# Patient Record
Sex: MALE | Race: WHITE | NOT HISPANIC OR LATINO | Employment: OTHER | URBAN - METROPOLITAN AREA
[De-identification: names, ages, dates, MRNs, and addresses within clinical notes are randomized per-mention and may not be internally consistent; named-entity substitution may affect disease eponyms.]

---

## 2018-05-02 ENCOUNTER — HOSPITAL ENCOUNTER (EMERGENCY)
Facility: MEDICAL CENTER | Age: 53
End: 2018-05-02
Attending: EMERGENCY MEDICINE
Payer: COMMERCIAL

## 2018-05-02 ENCOUNTER — APPOINTMENT (OUTPATIENT)
Dept: RADIOLOGY | Facility: MEDICAL CENTER | Age: 53
End: 2018-05-02
Attending: EMERGENCY MEDICINE
Payer: COMMERCIAL

## 2018-05-02 VITALS
SYSTOLIC BLOOD PRESSURE: 128 MMHG | HEIGHT: 71 IN | WEIGHT: 175.93 LBS | BODY MASS INDEX: 24.63 KG/M2 | DIASTOLIC BLOOD PRESSURE: 91 MMHG | OXYGEN SATURATION: 97 % | TEMPERATURE: 97.6 F | RESPIRATION RATE: 16 BRPM | HEART RATE: 76 BPM

## 2018-05-02 DIAGNOSIS — M25.562 ACUTE PAIN OF LEFT KNEE: ICD-10-CM

## 2018-05-02 PROCEDURE — 99284 EMERGENCY DEPT VISIT MOD MDM: CPT

## 2018-05-02 PROCEDURE — 93971 EXTREMITY STUDY: CPT

## 2018-05-02 PROCEDURE — 700102 HCHG RX REV CODE 250 W/ 637 OVERRIDE(OP): Performed by: EMERGENCY MEDICINE

## 2018-05-02 PROCEDURE — 700111 HCHG RX REV CODE 636 W/ 250 OVERRIDE (IP): Performed by: EMERGENCY MEDICINE

## 2018-05-02 PROCEDURE — A9270 NON-COVERED ITEM OR SERVICE: HCPCS | Performed by: EMERGENCY MEDICINE

## 2018-05-02 PROCEDURE — 73564 X-RAY EXAM KNEE 4 OR MORE: CPT | Mod: LT

## 2018-05-02 RX ORDER — PREDNISONE 20 MG/1
60 TABLET ORAL ONCE
Status: COMPLETED | OUTPATIENT
Start: 2018-05-03 | End: 2018-05-02

## 2018-05-02 RX ORDER — PREDNISONE 20 MG/1
60 TABLET ORAL DAILY
Status: DISCONTINUED | OUTPATIENT
Start: 2018-05-03 | End: 2018-05-02

## 2018-05-02 RX ORDER — INDOMETHACIN 50 MG/1
50 CAPSULE ORAL 3 TIMES DAILY
Qty: 30 CAP | Refills: 0 | Status: SHIPPED | OUTPATIENT
Start: 2018-05-02 | End: 2018-05-11

## 2018-05-02 RX ORDER — PREDNISONE 20 MG/1
60 TABLET ORAL DAILY
Qty: 15 TAB | Refills: 0 | Status: SHIPPED | OUTPATIENT
Start: 2018-05-02 | End: 2018-05-07

## 2018-05-02 RX ORDER — OXYCODONE HYDROCHLORIDE AND ACETAMINOPHEN 5; 325 MG/1; MG/1
2 TABLET ORAL ONCE
Status: COMPLETED | OUTPATIENT
Start: 2018-05-02 | End: 2018-05-02

## 2018-05-02 RX ADMIN — OXYCODONE HYDROCHLORIDE AND ACETAMINOPHEN 2 TABLET: 5; 325 TABLET ORAL at 22:03

## 2018-05-02 RX ADMIN — PREDNISONE 60 MG: 20 TABLET ORAL at 23:49

## 2018-05-02 ASSESSMENT — PAIN SCALES - GENERAL: PAINLEVEL_OUTOF10: 8

## 2018-05-03 NOTE — DISCHARGE INSTRUCTIONS
You were seen in the Emergency Department for knee pain.    Xrays and ultrasound were completed without significant acute abnormalities.    Please use 1,000mg of tylenol every 6 hours as needed for pain. Take indomethacin and prednisone as directed. Please elevate and stay off your lower extremity as much as possible.    Please follow up with your primary care physician.    Return to the Emergency Department with fevers greater than 100.4, uncontrolled pain, increasing redness, warmth or swelling, or other concerns.      Knee Pain  Knee pain is a very common symptom and can have many causes. Knee pain often goes away when you follow your health care provider's instructions for relieving pain and discomfort at home. However, knee pain can develop into a condition that needs treatment. Some conditions may include:  · Arthritis caused by wear and tear (osteoarthritis).  · Arthritis caused by swelling and irritation (rheumatoid arthritis or gout).  · A cyst or growth in your knee.  · An infection in your knee joint.  · An injury that will not heal.  · Damage, swelling, or irritation of the tissues that support your knee (torn ligaments or tendinitis).  If your knee pain continues, additional tests may be ordered to diagnose your condition. Tests may include X-rays or other imaging studies of your knee. You may also need to have fluid removed from your knee. Treatment for ongoing knee pain depends on the cause, but treatment may include:  · Medicines to relieve pain or swelling.  · Steroid injections in your knee.  · Physical therapy.  · Surgery.  HOME CARE INSTRUCTIONS  · Take medicines only as directed by your health care provider.  · Rest your knee and keep it raised (elevated) while you are resting.  · Do not do things that cause or worsen pain.  · Avoid high-impact activities or exercises, such as running, jumping rope, or doing jumping jacks.  · Apply ice to the knee area:  ¨ Put ice in a plastic bag.  ¨ Place a  towel between your skin and the bag.  ¨ Leave the ice on for 20 minutes, 2-3 times a day.  · Ask your health care provider if you should wear an elastic knee support.  · Keep a pillow under your knee when you sleep.  · Lose weight if you are overweight. Extra weight can put pressure on your knee.  · Do not use any tobacco products, including cigarettes, chewing tobacco, or electronic cigarettes. If you need help quitting, ask your health care provider. Smoking may slow the healing of any bone and joint problems that you may have.  SEEK MEDICAL CARE IF:  · Your knee pain continues, changes, or gets worse.  · You have a fever along with knee pain.  · Your knee gustavo or locks up.  · Your knee becomes more swollen.  SEEK IMMEDIATE MEDICAL CARE IF:   · Your knee joint feels hot to the touch.  · You have chest pain or trouble breathing.  This information is not intended to replace advice given to you by your health care provider. Make sure you discuss any questions you have with your health care provider.  Document Released: 10/14/2008 Document Revised: 01/08/2016 Document Reviewed: 08/03/2015  [employer] Interactive Patient Education © 2017 [employer]    Gout  Gout is painful swelling that can occur in some of your joints. Gout is a type of arthritis. This condition is caused by having too much uric acid in your body. Uric acid is a chemical that forms when your body breaks down substances called purines. Purines are important for building body proteins.  When your body has too much uric acid, sharp crystals can form and build up inside your joints. This causes pain and swelling. Gout attacks can happen quickly and be very painful (acute gout). Over time, the attacks can affect more joints and become more frequent (chronic gout). Gout can also cause uric acid to build up under your skin and inside your kidneys.  What are the causes?  This condition is caused by too much uric acid in your blood. This can occur  because:  · Your kidneys do not remove enough uric acid from your blood. This is the most common cause.  · Your body makes too much uric acid. This can occur with some cancers and cancer treatments. It can also occur if your body is breaking down too many red blood cells (hemolytic anemia).  · You eat too many foods that are high in purines. These foods include organ meats and some seafood. Alcohol, especially beer, is also high in purines.  A gout attack may be triggered by trauma or stress.  What increases the risk?  This condition is more likely to develop in people who:  · Have a family history of gout.  · Are male and middle-aged.  · Are female and have gone through menopause.  · Are obese.  · Frequently drink alcohol, especially beer.  · Are dehydrated.  · Lose weight too quickly.  · Have an organ transplant.  · Have lead poisoning.  · Take certain medicines, including aspirin, cyclosporine, diuretics, levodopa, and niacin.  · Have kidney disease or psoriasis.  What are the signs or symptoms?  An attack of acute gout happens quickly. It usually occurs in just one joint. The most common place is the big toe. Attacks often start at night. Other joints that may be affected include joints of the feet, ankle, knee, fingers, wrist, or elbow. Symptoms may include:  · Severe pain.  · Warmth.  · Swelling.  · Stiffness.  · Tenderness. The affected joint may be very painful to touch.  · Shiny, red, or purple skin.  · Chills and fever.  Chronic gout may cause symptoms more frequently. More joints may be involved. You may also have white or yellow lumps (tophi) on your hands or feet or in other areas near your joints.  How is this diagnosed?  This condition is diagnosed based on your symptoms, medical history, and physical exam. You may have tests, such as:  · Blood tests to measure uric acid levels.  · Removal of joint fluid with a needle (aspiration) to look for uric acid crystals.  · X-rays to look for joint  damage.  How is this treated?  Treatment for this condition has two phases: treating an acute attack and preventing future attacks. Acute gout treatment may include medicines to reduce pain and swelling, including:  · NSAIDs.  · Steroids. These are strong anti-inflammatory medicines that can be taken by mouth (orally) or injected into a joint.  · Colchicine. This medicine relieves pain and swelling when it is taken soon after an attack. It can be given orally or through an IV tube.  Preventive treatment may include:  · Daily use of smaller doses of NSAIDs or colchicine.  · Use of a medicine that reduces uric acid levels in your blood.  · Changes to your diet. You may need to see a specialist about healthy eating (dietitian).  Follow these instructions at home:  During a Gout Attack  · If directed, apply ice to the affected area:  ¨ Put ice in a plastic bag.  ¨ Place a towel between your skin and the bag.  ¨ Leave the ice on for 20 minutes, 2-3 times a day.  · Rest the joint as much as possible. If the affected joint is in your leg, you may be given crutches to use.  · Raise (elevate) the affected joint above the level of your heart as often as possible.  · Drink enough fluids to keep your urine clear or pale yellow.  · Take over-the-counter and prescription medicines only as told by your health care provider.  · Do not drive or operate heavy machinery while taking prescription pain medicine.  · Follow instructions from your health care provider about eating or drinking restrictions.  · Return to your normal activities as told by your health care provider. Ask your health care provider what activities are safe for you.  Avoiding Future Gout Attacks  · Follow a low-purine diet as told by your dietitian or health care provider. Avoid foods and drinks that are high in purines, including liver, kidney, anchovies, asparagus, herring, mushrooms, mussels, and beer.  · Limit alcohol intake to no more than 1 drink a day for  nonpregnant women and 2 drinks a day for men. One drink equals 12 oz of beer, 5 oz of wine, or 1½ oz of hard liquor.  · Maintain a healthy weight or lose weight if you are overweight. If you want to lose weight, talk with your health care provider. It is important that you do not lose weight too quickly.  · Start or maintain an exercise program as told by your health care provider.  · Drink enough fluids to keep your urine clear or pale yellow.  · Take over-the-counter and prescription medicines only as told by your health care provider.  · Keep all follow-up visits as told by your health care provider. This is important.  Contact a health care provider if:  · You have another gout attack.  · You continue to have symptoms of a gout attack after10 days of treatment.  · You have side effects from your medicines.  · You have chills or a fever.  · You have burning pain when you urinate.  · You have pain in your lower back or belly.  Get help right away if:  · You have severe or uncontrolled pain.  · You cannot urinate.  This information is not intended to replace advice given to you by your health care provider. Make sure you discuss any questions you have with your health care provider.  Document Released: 12/15/2001 Document Revised: 05/25/2017 Document Reviewed: 09/29/2016  [employer] Interactive Patient Education © 2017 [employer]

## 2018-05-03 NOTE — ED NOTES
Pt roomed from [facility], ambulatory. Gait steady with a limp. Pt presents for symptoms as stated in the triage note. ERP at bedside.

## 2018-05-03 NOTE — ED NOTES
D/C instructions reviewed in detail with pt. Pt states understanding. Scripts reviewed in detail and given x2. Pt left ambulatory. Family at side and will drive home.

## 2018-05-03 NOTE — ED PROVIDER NOTES
ED Provider Note    Scribed for Aleida Hernandez M.D. by Willie Hinton. 5/2/2018  9:33 PM    Means of arrival: Walk-in  History obtained from: Patient  History limited by: None      CHIEF COMPLAINT  Chief Complaint   Patient presents with   • Gout     L knee arthrocentesis at [facility] 2 days ago, does not take allopurinol or otherwise   • Knee Pain     L knee       HPI  Cheng Rodriguez is a 52 y.o. male who presents to the Emergency Department for evaluation of left knee pain. Per patient, he was evaluated at [facility] 2 days ago for left knee arthrocentesis and he explained that he had this knee drained. He was diagnosed with gout at that time. He was unable to have his prescriptions filled due to lack of insurance. Once he was released from the hospital, he was able to ambulate without any problems. However, the patient explains that he is on foot and does not have another mode of transportation. Since yesterday, his pain has started again and explains that this has been worsening since then. The patient has not taken any medications for this pain. He does admit to smoking marijuana and occasionally drinking alcohol. He denies recent fevers, chills, diarrhea, chest pain or difficulty breathing. The patient does explain that he has a past history of gout a few years ago and explains this was in his right knee. He also admits to a family history of gout. He notes that movement is an exacerbating factor.     REVIEW OF SYSTEMS  Pertinent positive include left knee pain. Pertinent negative include fevers, chills, diarrhea, chest pain, or difficulty breathing. All other systems reviewed and are negative.    C.       PAST MEDICAL HISTORY   has a past medical history of Gout.Past history of gout in his right knee.     SOCIAL HISTORY  Social History     Social History Main Topics   • Smoking status: None noted   • Smokeless tobacco: None noted   • Alcohol use None noted   • Drug use: None noted   • Sexual activity:  "None noted        SURGICAL HISTORY  patient denies any surgical history    CURRENT MEDICATIONS  No current facility-administered medications on file prior to encounter.      No current outpatient prescriptions on file prior to encounter.       ALLERGIES  No Known Allergies    PHYSICAL EXAM   VITAL SIGNS: /88   Pulse 87   Temp 36.2 °C (97.2 °F)   Resp 16   Ht 1.803 m (5' 11\")   Wt 79.8 kg (175 lb 14.8 oz)   SpO2 97%   BMI 24.54 kg/m²    Constitutional: Non-toxic appearing.  Alert in no apparent distress.  HENT: Normocephalic, Atraumatic. Bilateral external ears normal. Nose normal.  Moist mucous membranes.  Oropharynx clear.  Eyes: Pupils are equal and reactive. Conjunctiva normal.   Neck: Supple, full range of motion  Heart: Regular rate and rhythm.  No murmurs.    Lungs: No respiratory distress, normal work of breathing. Lungs clear to auscultation bilaterally.  Abdomen Soft, no distention.  No tenderness to palpation.  Musculoskeletal: Atraumatic. No obvious deformities noted.  Mild swelling to the left knee, no significant overlying warmth or erythema, associated left calf tenderness to palpation, 2+ DP and PT pulses. Moderate range of motion without significant pain.  Skin: Warm, Dry.  No erythema, No rash.   Neurologic: Alert and oriented x3. Moving all extremities spontaneously without focal deficits.  Psychiatric: Affect normal, Mood normal, Appears appropriate and not intoxicated.    DIAGNOSTIC STUDIES    RADIOLOGY  Personally reviewed by jere VALDEZ VENOUS DUPLEX (Specify in Comments Left, Right Or Bilateral)   Final Result      DX-KNEE COMPLETE 4+ LEFT   Final Result         1.  Small ossific fragment along the articulating surface of the medial patella, could represent small fracture fragment or loose body within the joint space.          ED COURSE  Vitals:    05/02/18 2057 05/02/18 2104 05/02/18 2350   BP:  135/88 128/91   Pulse:  87 76   Resp:  16 16   Temp:  36.2 °C (97.2 °F) 36.4 °C (97.6 °F) " "  SpO2:  97% 97%   Weight: 79.8 kg (175 lb 14.8 oz)     Height: 1.803 m (5' 11\")           Medications administered:  Medications   oxyCODONE-acetaminophen (PERCOCET) 5-325 MG per tablet 2 Tab (2 Tabs Oral Given 5/2/18 8478)   predniSONE (DELTASONE) tablet 60 mg (60 mg Oral Given 5/2/18 8939)         Old records personally reviewed:  Unremarkable x-ray when evaluated at [facility] 2 days prior. Reviewed arthrocentesis without crystals, gram stain negative, 660 WBC. Discharged home with indomethacin.     9:33 PM Patient seen and examined at bedside. The patient presents with left knee pain. Ordered for DX-knee complete 4+. LE venous duplex left to evaluate. Patient will be treated with 5-325 mg Percocet, 60 mg Deltasone tablet for his symptoms.     MEDICAL DECISION MAKING  Homeless patient with reported history of prior gout who presents with persistent left knee pain. Patient was seen and evaluated at [facility] 2 days prior with unremarkable x-rays and a negative arthrocentesis of the left knee. He is afebrile with normal vitals on arrival and nontoxic-appearing. He has no signs of systemic illness. Repeat x-rays here demonstrates a possible loose body within the joint however he do not believe that this is acute. He has no evidence of fracture or dislocation. Lower extremity ultrasound is negative for DVT. Patient has no exam findings concerning for septic arthritis, cellulitis or necrotizing fasciitis at this time. After reviewing his arthrocentesis from 2 days prior do not feel that this needs to be repeated. He had no evidence of septic arthritis or crystals however does have a history of gout therefore will be treated in this manner. I had a long discussion with the patient that he needs to attempt to take his prescribed medications and attempt to stay off his leg which I understand is very difficult as he is homeless.      11:42 PM - Upon reassessment, patient is resting comfortably with normal " vital signs.  No new complaints at this time.  Discussed results with patient and/or family as well as importance of primary care follow up.  Patient understands plan of care and strict return precautions for new or changing symptoms.       The patient will return for new or worsening symptoms and is stable at the time of discharge. The patient is referred to a primary physician for blood pressure management, diabetic screening, and for all other preventative health concerns.    DISPOSITION:  Patient will be discharged home in stable condition.    FOLLOW UP:  [facility]  [address] [place] [id number]  [phone]  Call  to establish PCP    [facility], Emergency Dept  [address] [facility] [address] [phone]    If symptoms worsen      OUTPATIENT MEDICATIONS:  Discharge Medication List as of 5/2/2018 11:43 PM      START taking these medications    Details   indomethacin (INDOCIN) 50 MG Cap Take 1 Cap by mouth 3 times a day., Disp-30 Cap, R-0, Print Rx Paper      predniSONE (DELTASONE) 20 MG Tab Take 3 Tabs by mouth every day for 5 days., Disp-15 Tab, R-0, Print Rx Paper               IMPRESSION  (M25.562) Acute pain of left knee    Results, diagnoses, and treatment options were discussed with the patient and/or family. Patient verbalized understanding of plan of care.    Discharge Medication List as of 5/2/2018 11:43 PM      START taking these medications    Details   indomethacin (INDOCIN) 50 MG Cap Take 1 Cap by mouth 3 times a day., Disp-30 Cap, R-0, Print Rx Paper      predniSONE (DELTASONE) 20 MG Tab Take 3 Tabs by mouth every day for 5 days., Disp-15 Tab, R-0, Print Rx Paper              Willie MELARA (Steven), am scribing for, and in the presence of, Aleida Hernandez M.D..    Electronically signed by: Willie Martinez), 5/2/2018    Aleida MELARA M.D. personally performed the services described in this documentation, as scribed by Willie Hinton  in my presence, and it is both accurate and complete.    The note accurately reflects work and decisions made by me.  Aleida Hernandez  5/3/2018  3:04 AM

## 2018-05-03 NOTE — ED TRIAGE NOTES
"Chief Complaint   Patient presents with   • Gout     L knee arthrocentesis at [facility] 2 days ago, does not take allopurinol or otherwise   • Knee Pain     L knee     Pt ambulatory to triage w/ some obvious difficulty and L sided gait disturbance. Pt is obviously uncomfortable. Pt states he has not been able to get prescription for various insurance reasons. Pt's L knee is markedly swollen as compared to R, no erythema noted, no visual tophi. Pt states he has generalized low level of joint pain, denies SOB. Pt placed back in lobby at this time.    Height 1.803 m (5' 11\"), weight 79.8 kg (175 lb 14.8 oz).      "

## 2018-05-11 ENCOUNTER — APPOINTMENT (OUTPATIENT)
Dept: RADIOLOGY | Facility: MEDICAL CENTER | Age: 53
End: 2018-05-11
Payer: COMMERCIAL

## 2018-05-11 ENCOUNTER — APPOINTMENT (OUTPATIENT)
Dept: RADIOLOGY | Facility: MEDICAL CENTER | Age: 53
End: 2018-05-11
Attending: EMERGENCY MEDICINE
Payer: COMMERCIAL

## 2018-05-11 ENCOUNTER — HOSPITAL ENCOUNTER (EMERGENCY)
Facility: MEDICAL CENTER | Age: 53
End: 2018-05-12
Attending: EMERGENCY MEDICINE
Payer: COMMERCIAL

## 2018-05-11 DIAGNOSIS — M25.562 ACUTE PAIN OF LEFT KNEE: ICD-10-CM

## 2018-05-11 DIAGNOSIS — M25.462 KNEE EFFUSION, LEFT: ICD-10-CM

## 2018-05-11 DIAGNOSIS — Z87.39 HISTORY OF GOUT: ICD-10-CM

## 2018-05-11 LAB
GRAM STN SPEC: NORMAL
SIGNIFICANT IND 70042: NORMAL
SITE SITE: NORMAL
SOURCE SOURCE: NORMAL

## 2018-05-11 PROCEDURE — 87070 CULTURE OTHR SPECIMN AEROBIC: CPT

## 2018-05-11 PROCEDURE — 89051 BODY FLUID CELL COUNT: CPT

## 2018-05-11 PROCEDURE — 700101 HCHG RX REV CODE 250: Performed by: EMERGENCY MEDICINE

## 2018-05-11 PROCEDURE — 87205 SMEAR GRAM STAIN: CPT

## 2018-05-11 PROCEDURE — 700111 HCHG RX REV CODE 636 W/ 250 OVERRIDE (IP): Performed by: EMERGENCY MEDICINE

## 2018-05-11 PROCEDURE — 20610 DRAIN/INJ JOINT/BURSA W/O US: CPT

## 2018-05-11 PROCEDURE — 99284 EMERGENCY DEPT VISIT MOD MDM: CPT

## 2018-05-11 PROCEDURE — 89060 EXAM SYNOVIAL FLUID CRYSTALS: CPT

## 2018-05-11 PROCEDURE — 73564 X-RAY EXAM KNEE 4 OR MORE: CPT | Mod: LT

## 2018-05-11 RX ORDER — KETOROLAC TROMETHAMINE 30 MG/ML
30 INJECTION, SOLUTION INTRAMUSCULAR; INTRAVENOUS ONCE
Status: COMPLETED | OUTPATIENT
Start: 2018-05-11 | End: 2018-05-11

## 2018-05-11 RX ORDER — LIDOCAINE HYDROCHLORIDE AND EPINEPHRINE BITARTRATE 20; .01 MG/ML; MG/ML
10 INJECTION, SOLUTION SUBCUTANEOUS ONCE
Status: COMPLETED | OUTPATIENT
Start: 2018-05-11 | End: 2018-05-11

## 2018-05-11 RX ADMIN — LIDOCAINE HYDROCHLORIDE AND EPINEPHRINE 10 ML: 20; 10 INJECTION, SOLUTION INFILTRATION; PERINEURAL at 22:14

## 2018-05-11 RX ADMIN — KETOROLAC TROMETHAMINE 30 MG: 30 INJECTION, SOLUTION INTRAMUSCULAR at 23:05

## 2018-05-11 ASSESSMENT — LIFESTYLE VARIABLES
EVER HAD A DRINK FIRST THING IN THE MORNING TO STEADY YOUR NERVES TO GET RID OF A HANGOVER: NO
ON A TYPICAL DAY WHEN YOU DRINK ALCOHOL HOW MANY DRINKS DO YOU HAVE: 4
CONSUMPTION TOTAL: NEGATIVE
EVER FELT BAD OR GUILTY ABOUT YOUR DRINKING: NO
HAVE YOU EVER FELT YOU SHOULD CUT DOWN ON YOUR DRINKING: NO
HAVE PEOPLE ANNOYED YOU BY CRITICIZING YOUR DRINKING: NO
TOTAL SCORE: 0
HOW MANY TIMES IN THE PAST YEAR HAVE YOU HAD 5 OR MORE DRINKS IN A DAY: 0
TOTAL SCORE: 0
TOTAL SCORE: 0
DO YOU DRINK ALCOHOL: YES
AVERAGE NUMBER OF DAYS PER WEEK YOU HAVE A DRINK CONTAINING ALCOHOL: 3

## 2018-05-11 ASSESSMENT — PAIN SCALES - GENERAL
PAINLEVEL_OUTOF10: 5
PAINLEVEL_OUTOF10: 8

## 2018-05-12 ENCOUNTER — PATIENT OUTREACH (OUTPATIENT)
Dept: HEALTH INFORMATION MANAGEMENT | Facility: OTHER | Age: 53
End: 2018-05-12

## 2018-05-12 VITALS
TEMPERATURE: 97.7 F | WEIGHT: 174.82 LBS | HEART RATE: 88 BPM | SYSTOLIC BLOOD PRESSURE: 130 MMHG | RESPIRATION RATE: 17 BRPM | DIASTOLIC BLOOD PRESSURE: 75 MMHG | HEIGHT: 71 IN | BODY MASS INDEX: 24.48 KG/M2 | OXYGEN SATURATION: 98 %

## 2018-05-12 LAB
APPEARANCE FLD: CLEAR
BODY FLD TYPE: NORMAL
COLOR FLD: YELLOW
CRYSTALS FLD MICRO: NORMAL
CSF COMMENTS 1658: NORMAL
HISTIOCYTES NFR FLD: 2 %
LYMPHOCYTES NFR FLD: 45 %
MESOTHL CELL NFR FLD: 1 %
MONONUC CELLS NFR FLD: 48 %
NEUTROPHILS NFR FLD: 4 %
RBC # FLD: <2000 CELLS/UL
WBC # FLD: 412 CELLS/UL

## 2018-05-12 PROCEDURE — 99284 EMERGENCY DEPT VISIT MOD MDM: CPT

## 2018-05-12 PROCEDURE — 96372 THER/PROPH/DIAG INJ SC/IM: CPT

## 2018-05-12 RX ORDER — ACETAMINOPHEN 500 MG
1000 TABLET ORAL EVERY 8 HOURS PRN
Qty: 20 TAB | Refills: 0 | Status: SHIPPED | OUTPATIENT
Start: 2018-05-12 | End: 2018-07-16

## 2018-05-12 RX ORDER — IBUPROFEN 600 MG/1
600 TABLET ORAL EVERY 8 HOURS PRN
Qty: 20 TAB | Refills: 0 | Status: SHIPPED | OUTPATIENT
Start: 2018-05-12 | End: 2018-07-16

## 2018-05-12 NOTE — ED PROVIDER NOTES
"ED PROVIDER NOTE     Scribed for Memo Christensen M.D. by Ab Winter. 5/11/2018, 9:51 PM.    CHIEF COMPLAINT  Chief Complaint   Patient presents with   • Knee Pain     Pt. states left knee pain. Pt. has swelling to his left knee. Pt. state hx of gout and arthritis. Pt. states he has been seen here recently and was told to keep off of it but is unable to do so. Pt. states pain and swelling keeps getting worse. Pt. able to ambulate with steady but limping giat.        HPI    Primary care provider: None  Means of arrival: walk-in  History obtained from: patient  History limited by: none    Cheng Rodriguez is a 52 y.o. male who presents with left knee pain. Patient confirms a history of gout, for which he experiences chronic left knee pain. He states his pain is constant, and he endorses associated swelling. Patient does not note any recent injuries or trauma to his left knee. He describes his pain as \"sharp\", and he rates his pain as moderate in severity. The patient reports walking exacerbates his pain. He does report that he had relief with 2 medications prescribed recently, but he has run out of those medicines (on records review this was indomethacin and prednisone). Patient denies fevers, numbness, loss of motor function, rash, foot pain, or ankle pain or any other joint pain.  No eye pain, sore throat, or dysuria.  He also denies alcohol use. Patient admits to occasional marijuana use, but denies IV drug use. Other than gout, patient does not report any chronic medical history.     REVIEW OF SYSTEMS  Constitutional: Negative for fever or chills.  Musculoskeletal: Positive for left knee pain and left knee swelling. Negative for foot pain or ankle pain.   Skin: Negative for rash or redness or wounds.  Neuro: Negative for sensory loss or weakness.  Psychiatric/Behavioral: Negative for alcohol use or IV drug use.   All other systems reviewed and are negative.    PAST MEDICAL HISTORY   has a past medical history " "of Gout.    PAST FAMILY HISTORY  History reviewed. No pertinent family history.    SOCIAL HISTORY  Social History     Social History Main Topics   • Smoking status: Current Every Day Smoker     Packs/day: 0.12     Years: 24.00     Types: Cigarettes   • Smokeless tobacco: Never Used   • Alcohol use Yes      Comment: Socially   • Drug use: Yes     Types: Inhaled      Comment: Marijuana   • Sexual activity: Not on file       SURGICAL HISTORY  patient denies any surgical history    CURRENT MEDICATIONS  Recently prescribed prednisone and indomethacin, no chronic medications.    ALLERGIES  No Known Allergies    PHYSICAL EXAM  VITAL SIGNS: /77   Pulse 94   Temp 36 °C (96.8 °F)   Resp 14   Ht 1.803 m (5' 11\")   Wt 79.3 kg (174 lb 13.2 oz)   SpO2 97%   BMI 24.38 kg/m²    Pulse ox interpretation: On room air, I interpret this pulse ox as normal.  Constitutional: Uncomfortable appearing in mild distress.  HENT: Head is atraumatic. Mucous membranes moist.   Eyes: Conjunctivae are normal. EOMI.   Respiratory: No respiratory distress. Equal chest expansion.   Cardiovascular: RRR, no m/r/g.  Abdomen: Soft, nontender.  Musculoskeletal: Left knee swelling and pain with range of motion. No erythema. DP pulse intact. Neurovascularly intact distal to the knee.  No other joint pain or swelling.  Neurological: Alert. No focal deficits noted.    Skin: No rash. No Pallor.  No erythema.  Psych: Appropriate mood. Normal affect.     DIAGNOSTIC STUDIES / PROCEDURES    LABS/EKG  Results for orders placed or performed during the hospital encounter of 05/11/18   FLUID CELL COUNT   Result Value Ref Range    Fluid Type Synovial     Color-Body Fluid Yellow     Character-Body Fluid Clear     Total RBC Count <2000 cells/uL    Total  cells/uL    Polys 4 %    Lymphs 45 %    Mononuclear Cells - Fluid 48 %    Mesothelial Cells - CSF 1 %    Fluid Histiocyte 2 %    Comments see below    FLUID CRYSTALS   Result Value Ref Range    " Crystals, Body Fluid None Seen None Seen   GRAM STAIN   Result Value Ref Range    Significant Indicator .     Source SYNO     Site Left Knee     Gram Stain Result 05/11/2018  23:41  No organisms seen.         RADIOLOGY  DX-KNEE COMPLETE 4+ LEFT   Final Result         1. Moderate knee joint effusion, slightly less than prior.          PROCEDURES  Arthrocentesis Procedure Note    Indication: joint swelling    Consent: The patient was counseled regarding the procedure, it's indications, risks, potential complications and alternatives and any questions were answered. Consent was obtained.    Procedure: The left knee was positioned appropriately and the landmarks were identified.  Local anesthesia was obtained by infiltration using 2% Lidocaine with epinephrine.  The area was then prepped and draped in the usual sterile fashion.  A needle was then introduced into the joint space at which point 70 cc of clear, straw colored fluid was aspirated.  A joint injection was not performed.  The aspirated fluid was sent to the lab for appropriate testing.  A sterile dressing was then applied to the site.    The patient tolerated the procedure well.    Complications: None    COURSE & MEDICAL DECISION MAKING    This is a 52 y.o. male who presents with recurrent knee pain and swelling in the site where he is previously been diagnosed with gout.    Differential Diagnosis includes but is not limited to:  Fracture, dislocation, effusion, gout, septic arthritis, chronic pain    ED Course:  9:54 PM - Patient was seen and examined at bedside. Ordered knee x-ray to evaluate. Discussed possibly performing an arthrocentesis procedure. Patient was counseled on the procedure and the risks associated, including the possibility of infection, trauma, pain, bleeding.  He understood and verbalized agreement.       10:08 PM - Updated the patient on his imaging results, which reveal moderate knee effusion. I will perform an arthrocentesis procedure.  Patient understood and verbalized agreement.     10:40 PM - Performed Arthrocentesis procedure. See above note for further details.  Ordered fluid crystals, fluid culture, and fluid cell count to further evaluate. The patient will be treated with Toradol 30 mg for pain.     Synovial fluid analysis does not show obvious crystals, however there is no evidence of infection at this time.  Gram stain is negative, and there is not a significant amount of white blood cells.  Maxillary weather crystals which is not seen on lab testing as the patient says she has had a diagnosis of gout in the past, his history is consistent with gout, and I removed a large amount of straw-colored fluid that appeared to be gouty.  With no fevers or redness or white blood cells in the fluid I highly doubt septic joint.    12:53 AM - Reevaluated the patient at bedside. The patient is resting comfortably in the gurney, and he is feeling improved. Updated the patient on his lab and imaging results. The patient was counseled to follow-up with a primary care provider, for which I will provide contact information and I personally left a voicemail for scheduled follow-up for urgent internal medicine appointment. He will be discharged with prescriptions for Ibuprofen and Tylenol for pain management. He was given return precautions and welcomed to return to the ED with new or worsening symptoms. The patient understood and verbalized agreement.  Given he was just recently on a steroid course I will not give another at this time, and hope that with fluid removal, as well as assistive device provided (cane obtained by Ortho tech), and rest that he will hopefully recover well.    Medications   lidocaine-epinephrine 2 %-1:967142 injection 10 mL (10 mL Injection Given 5/11/18 2214)   ketorolac (TORADOL) injection 30 mg (30 mg Intramuscular Given 5/11/18 9557)     FINAL IMPRESSION  1. Acute pain of left knee    2. Knee effusion, left    3. History of gout         PRESCRIPTIONS  Discharge Medication List as of 5/12/2018  1:11 AM      START taking these medications    Details   ibuprofen (MOTRIN) 600 MG Tab Take 1 Tab by mouth every 8 hours as needed for Moderate Pain or Inflammation., Disp-20 Tab, R-0, Print Rx Paper      acetaminophen (TYLENOL) 500 MG Tab Take 2 Tabs by mouth every 8 hours as needed for Mild Pain or Moderate Pain., Disp-20 Tab, R-0, Print Rx Paper             FOLLOW UP  [facility]  [address] [place] [phone]  Schedule an appointment as soon as possible for a visit in 2 days  to set up a primary doctor    [address] [address] [phone]  Schedule an appointment as soon as possible for a visit in 2 days  to get a regular doctor    [facility], Emergency Dept  [address] [place] [id number] [phone]  In 1 day  If symptoms worsen        -DISCHARGE-     The patient is referred to a primary physician for the follow-up of today's complaints, as well as blood pressure management, diabetic screening, and for all other preventative health concerns.     Pertinent Labs & Imaging studies reviewed and verified by myself, as well as nursing notes and the patient's past medical, family, and social histories (See chart for details).    Results, exam findings, clinical impression, presumed diagnosis, treatment options, and strict return precautions were discussed with the patient, and they verbalized understanding, agreed with, and appreciated the plan of care.    IAb (Steven), am scribing for, and in the presence of, Memo Christensen M.D..    Electronically signed by: Ab Winter (Steven), 5/11/2018    Memo MELARA M.D. personally performed the services described in this documentation, as scribed by Ab Winter in my presence, and it is both accurate and complete.    Portions of this record were made with voice recognition software.   Despite my review, spelling/grammar/context errors may still remain.  Interpretation of this chart should be taken in this context.    The note accurately reflects work and decisions made by me.  Memo Christensen  5/12/2018  12:11 PM

## 2018-05-12 NOTE — ED TRIAGE NOTES
"Cheng Rodriguez  52 y.o. male  Chief Complaint   Patient presents with   • Knee Pain     Pt. states left knee pain. Pt. has swelling to his left knee. Pt. state hx of gout and arthritis. Pt. states he has been seen here recently and was told to keep off of it but is unable to do so. Pt. states pain and swelling keeps getting worse. Pt. able to ambulate with steady but limping giat.      /77   Pulse 94   Temp 36 °C (96.8 °F)   Resp 14   Ht 1.803 m (5' 11\")   Wt 79.3 kg (174 lb 13.2 oz)   SpO2 97%   BMI 24.38 kg/m²     Pt amb to triage with steady gait with slight left sided limp.  Pt is alert and oriented, speaking in full sentences, follows commands and responds appropriately to questions. NAD. Resp are even and unlabored.  Pt placed in lobby. Pt educated on triage process. Pt encouraged to alert staff for any changes.  "

## 2018-05-12 NOTE — ED NOTES
L knee tapped at bedside by Dr. Christensen. Patient tolerated well with minimal complaints of pain/discomfort. Fluid specimens collected during procedure sent to lab per orders.

## 2018-05-12 NOTE — ED NOTES
Pt ambulatory at discharge with cane. Pt verbalizes understanding of all discharge and follow up instructions.

## 2018-05-12 NOTE — DISCHARGE INSTRUCTIONS
You were seen and evaluated in the Emergency Department at [facility] for:     Knee pain and swelling    You had the following tests and studies:    Xray shows no broken bones or dislocations    You received the following medications:    Lidocaine injection, ketorolac    You received the following prescriptions:    Ibuprofen and acetaminophen   ----------------------------    Please make sure to follow up with:    A primary care doctor for a recheck on Monday, but please come right back to the ER if you get ANY new or worse pain, redness, fevers, swelling, inability to walk, or any other new or worsening symptoms.    Good luck, we hope you get better soon!  ----------------------------    We always encourage patients to return IMMEDIATELY if they have:  Increased or changing pain, passing out, fevers over 100.4 (taken in your mouth or rectally) for more than 2 days, redness or swelling of skin or tissues, feeling like your heart is beating fast, chest pain that is new or worsening, trouble breathing, feeling like your throat is closing up and can not breath, inability to walk, weakness of any part of your body, new dizziness, severe bleeding that won't stop from any part of your body, if you can't eat or drink, or if you have any other concerns.   If you feel worse, please know that you can always return with any questions, concerns, worse symptoms, or you are feeling unsafe. We certainly cannot say for sure that we have ruled out every illness or dangerous disease, but we feel that at this specific time, your exam, tests, and vital signs like heart rate and blood pressure are safe for discharge.         Joint Pain  Introduction  Joint pain can be caused by many things. The joint can be bruised, infected, weak from aging, or sore from exercise. The pain will probably go away if you follow your doctor's instructions for home care. If your joint pain continues, more tests may be needed to help find the  cause of your condition.  Follow these instructions at home:  Watch your condition for any changes. Follow these instructions as told to lessen the pain that you are feeling:  · Take medicines only as told by your doctor.  · Rest the sore joint for as long as told by your doctor. If your doctor tells you to, raise (elevate) the painful joint above the level of your heart while you are sitting or lying down.  · Do not do things that cause pain or make the pain worse.  · If told, put ice on the painful area:  ¨ Put ice in a plastic bag.  ¨ Place a towel between your skin and the bag.  ¨ Leave the ice on for 20 minutes, 2-3 times per day.  · Wear an elastic bandage, splint, or sling as told by your doctor. Loosen the bandage or splint if your fingers or toes lose feeling (become numb) and tingle, or if they turn cold and blue.  · Begin exercising or stretching the joint as told by your doctor. Ask your doctor what types of exercise are safe for you.  · Keep all follow-up visits as told by your doctor. This is important.  Contact a doctor if:  · Your pain gets worse and medicine does not help it.  · Your joint pain does not get better in 3 days.  · You have more bruising or swelling.  · You have a fever.  · You lose 10 pounds (4.5 kg) or more without trying.  Get help right away if:  · You are not able to move the joint.  · Your fingers or toes become numb or they turn cold and blue.  This information is not intended to replace advice given to you by your health care provider. Make sure you discuss any questions you have with your health care provider.  Document Released: 12/06/2010 Document Revised: 05/25/2017 Document Reviewed: 09/29/2015  © 2017 Elsevier      Knee Pain  Introduction  Knee pain is a common problem. It can have many causes. The pain often goes away by following your doctor's home care instructions. Treatment for ongoing pain will depend on the cause of your pain. If your knee pain continues, more tests  may be needed to diagnose your condition. Tests may include X-rays or other imaging studies of your knee.  Follow these instructions at home:  · Take medicines only as told by your doctor.  · Rest your knee and keep it raised (elevated) while you are resting.  · Do not do things that cause pain or make your pain worse.  · Avoid activities where both feet leave the ground at the same time, such as running, jumping rope, or doing jumping jacks.  · Apply ice to the knee area:  ¨ Put ice in a plastic bag.  ¨ Place a towel between your skin and the bag.  ¨ Leave the ice on for 20 minutes, 2-3 times a day.  · Ask your doctor if you should wear an elastic knee support.  · Sleep with a pillow under your knee.  · Lose weight if you are overweight. Being overweight can make your knee hurt more.  · Do not use any tobacco products, including cigarettes, chewing tobacco, or electronic cigarettes. If you need help quitting, ask your doctor. Smoking may slow the healing of any bone and joint problems that you may have.  Contact a doctor if:  · Your knee pain does not stop, it changes, or it gets worse.  · You have a fever along with knee pain.  · Your knee gives out or locks up.  · Your knee becomes more swollen.  Get help right away if:  · Your knee feels hot to the touch.  · You have chest pain or trouble breathing.  This information is not intended to replace advice given to you by your health care provider. Make sure you discuss any questions you have with your health care provider.  Document Released: 03/16/2010 Document Revised: 05/25/2017 Document Reviewed: 02/18/2015  © 2017 Elsevier      Knee Effusion  Introduction  Knee effusion means that you have extra fluid in your knee. This can cause pain. Your knee may be more difficult to bend and move.  Follow these instructions at home:  · Use crutches as told by your doctor.  · Wear a knee brace as told by your doctor.  · Apply ice to the swollen area:  ¨ Put ice in a plastic  bag.  ¨ Place a towel between your skin and the bag.  ¨ Leave the ice on for 20 minutes, 2-3 times per day.  · Keep your knee raised (elevated) when you are sitting or lying down.  · Take medicines only as told by your doctor.  · Do any rehabilitation or strengthening exercises as told by your doctor.  · Rest your knee as told by your doctor. You may start doing your normal activities again when your doctor says it is okay.  · Keep all follow-up visits as told by your doctor. This is important.  Contact a doctor if:  · You continue to have pain in your knee.  Get help right away if:  · You have increased swelling or redness of your knee.  · You have severe pain in your knee.  · You have a fever.  This information is not intended to replace advice given to you by your health care provider. Make sure you discuss any questions you have with your health care provider.  Document Released: 01/20/2012 Document Revised: 05/25/2017 Document Reviewed: 08/03/2015  © 2017 Elsevier

## 2018-05-14 LAB
BACTERIA FLD AEROBE CULT: NORMAL
GRAM STN SPEC: NORMAL
SIGNIFICANT IND 70042: NORMAL
SITE SITE: NORMAL
SOURCE SOURCE: NORMAL

## 2018-06-12 ENCOUNTER — HOSPITAL ENCOUNTER (EMERGENCY)
Facility: MEDICAL CENTER | Age: 53
End: 2018-06-13
Attending: EMERGENCY MEDICINE
Payer: COMMERCIAL

## 2018-06-12 DIAGNOSIS — M25.562 CHRONIC PAIN OF LEFT KNEE: ICD-10-CM

## 2018-06-12 DIAGNOSIS — G89.29 CHRONIC PAIN OF LEFT KNEE: ICD-10-CM

## 2018-06-12 PROCEDURE — 99284 EMERGENCY DEPT VISIT MOD MDM: CPT

## 2018-06-12 ASSESSMENT — PAIN SCALES - GENERAL: PAINLEVEL_OUTOF10: 7

## 2018-06-13 VITALS
DIASTOLIC BLOOD PRESSURE: 85 MMHG | HEIGHT: 70 IN | TEMPERATURE: 98.4 F | BODY MASS INDEX: 24.9 KG/M2 | RESPIRATION RATE: 16 BRPM | WEIGHT: 173.94 LBS | HEART RATE: 80 BPM | OXYGEN SATURATION: 94 % | SYSTOLIC BLOOD PRESSURE: 119 MMHG

## 2018-06-13 PROCEDURE — 700102 HCHG RX REV CODE 250 W/ 637 OVERRIDE(OP): Performed by: EMERGENCY MEDICINE

## 2018-06-13 PROCEDURE — A9270 NON-COVERED ITEM OR SERVICE: HCPCS | Performed by: EMERGENCY MEDICINE

## 2018-06-13 RX ORDER — TRAMADOL HYDROCHLORIDE 50 MG/1
50 TABLET ORAL ONCE
Status: COMPLETED | OUTPATIENT
Start: 2018-06-13 | End: 2018-06-13

## 2018-06-13 RX ORDER — TRAMADOL HYDROCHLORIDE 50 MG/1
50 TABLET ORAL EVERY 6 HOURS PRN
Qty: 12 TAB | Refills: 0 | Status: SHIPPED | OUTPATIENT
Start: 2018-06-13 | End: 2018-06-20

## 2018-06-13 RX ORDER — INDOMETHACIN 50 MG/1
50 CAPSULE ORAL ONCE
Status: COMPLETED | OUTPATIENT
Start: 2018-06-13 | End: 2018-06-13

## 2018-06-13 RX ORDER — INDOMETHACIN 50 MG/1
50 CAPSULE ORAL 3 TIMES DAILY
Qty: 30 CAP | Refills: 0 | Status: SHIPPED | OUTPATIENT
Start: 2018-06-13 | End: 2018-07-16

## 2018-06-13 RX ADMIN — INDOMETHACIN 50 MG: 50 CAPSULE ORAL at 00:51

## 2018-06-13 RX ADMIN — TRAMADOL HYDROCHLORIDE 50 MG: 50 TABLET, COATED ORAL at 00:51

## 2018-06-13 NOTE — ED TRIAGE NOTES
"Cheng Rodriguez  53 y.o.  /75   Pulse 77   Temp 37.2 °C (98.9 °F)   Resp 16   Ht 1.778 m (5' 10\")   Wt 78.9 kg (173 lb 15.1 oz)   SpO2 98%   BMI 24.96 kg/m²   Chief Complaint   Patient presents with   • Knee Pain     Left knee pain x 3 weeks. Seen for gout and fluid on knee, now states he thinks he pulled something      Sharp pains 7/10 in L knee, states feels like its going to dislocate with walking. Uses single point cane for ambulation. Would like to get pain medication and a referral to the [facility] to get it fixed. Swelling noted, no heat, discoloration. Pt safe to be returned to lobby, educated on triage process and wait times, instructed to notify any staff of worsening/changing of symptoms.      "

## 2018-06-13 NOTE — DISCHARGE INSTRUCTIONS
Knee Pain  Knee pain is a very common symptom and can have many causes. Knee pain often goes away when you follow your health care provider's instructions for relieving pain and discomfort at home. However, knee pain can develop into a condition that needs treatment. Some conditions may include:  · Arthritis caused by wear and tear (osteoarthritis).  · Arthritis caused by swelling and irritation (rheumatoid arthritis or gout).  · A cyst or growth in your knee.  · An infection in your knee joint.  · An injury that will not heal.  · Damage, swelling, or irritation of the tissues that support your knee (torn ligaments or tendinitis).  If your knee pain continues, additional tests may be ordered to diagnose your condition. Tests may include X-rays or other imaging studies of your knee. You may also need to have fluid removed from your knee. Treatment for ongoing knee pain depends on the cause, but treatment may include:  · Medicines to relieve pain or swelling.  · Steroid injections in your knee.  · Physical therapy.  · Surgery.  HOME CARE INSTRUCTIONS  · Take medicines only as directed by your health care provider.  · Rest your knee and keep it raised (elevated) while you are resting.  · Do not do things that cause or worsen pain.  · Avoid high-impact activities or exercises, such as running, jumping rope, or doing jumping jacks.  · Apply ice to the knee area:  ¨ Put ice in a plastic bag.  ¨ Place a towel between your skin and the bag.  ¨ Leave the ice on for 20 minutes, 2-3 times a day.  · Ask your health care provider if you should wear an elastic knee support.  · Keep a pillow under your knee when you sleep.  · Lose weight if you are overweight. Extra weight can put pressure on your knee.  · Do not use any tobacco products, including cigarettes, chewing tobacco, or electronic cigarettes. If you need help quitting, ask your health care provider. Smoking may slow the healing of any bone and joint problems that you  may have.  SEEK MEDICAL CARE IF:  · Your knee pain continues, changes, or gets worse.  · You have a fever along with knee pain.  · Your knee gustavo or locks up.  · Your knee becomes more swollen.  SEEK IMMEDIATE MEDICAL CARE IF:   · Your knee joint feels hot to the touch.  · You have chest pain or trouble breathing.  This information is not intended to replace advice given to you by your health care provider. Make sure you discuss any questions you have with your health care provider.  Document Released: 10/14/2008 Document Revised: 01/08/2016 Document Reviewed: 08/03/2015  [employer]vier Interactive Patient Education © 2017 [employer]

## 2018-06-13 NOTE — ED PROVIDER NOTES
ED Provider Note    Scribed for Aleida Hernandez M.D. by Willie Hinton. 6/12/2018  11:59 PM    Means of arrival: Walk-in  History obtained from: Patient  History limited by: None      CHIEF COMPLAINT  Chief Complaint   Patient presents with   • Knee Pain     Left knee pain x 3 weeks. Seen for gout and fluid on knee, now states he thinks he pulled something        HPI  Cheng Rodriguez is a 53 y.o. male who presents to the Emergency Department for with complaints of left knee pain. The patient explains that he has a history of gout and has had this drained previously in the last few months. He further explains that about 3 weeks ago he began having worsening pain in his left knee and he now thinks that he pulled something in the knee. He denies any significant trauma or falls.  Patient reports that he has not been able to walk further then 3 blocks or work recently. He denies any associated calf pain. The patient has not taken any Tylenol or Ibuprofen recently. Patient was given Tramadol and Indomethacin previously and he admits that this did alleviate his symptoms. No fevers or infectious symptoms.    REVIEW OF SYSTEMS  Pertinent positive include left knee pain. Pertinent negative include calf pain.    E.     PAST MEDICAL HISTORY   has a past medical history of Gout.    SOCIAL HISTORY  Social History     Social History Main Topics   • Smoking status: Current Every Day Smoker     Packs/day: 0.12     Years: 24.00     Types: Cigarettes   • Smokeless tobacco: Never Used   • Alcohol use Yes      Comment: Socially   • Drug use: Yes     Types: Inhaled      Comment: Marijuana   • Sexual activity: None noted       SURGICAL HISTORY  patient denies any surgical history    CURRENT MEDICATIONS  No current facility-administered medications on file prior to encounter.      Current Outpatient Prescriptions on File Prior to Encounter   Medication Sig Dispense Refill   • ibuprofen (MOTRIN) 600 MG Tab Take 1 Tab by mouth every 8 hours  "as needed for Moderate Pain or Inflammation. 20 Tab 0   • acetaminophen (TYLENOL) 500 MG Tab Take 2 Tabs by mouth every 8 hours as needed for Mild Pain or Moderate Pain. 20 Tab 0         ALLERGIES  No Known Allergies    PHYSICAL EXAM   VITAL SIGNS: /75   Pulse 77   Temp 37.2 °C (98.9 °F)   Resp 16   Ht 1.778 m (5' 10\")   Wt 78.9 kg (173 lb 15.1 oz)   SpO2 98%   BMI 24.96 kg/m²    Constitutional: Nontoxic appearing male.  Alert in no apparent distress.  HENT: Normocephalic, Atraumatic. Bilateral external ears normal. Nose normal.  Moist mucous membranes.  Oropharynx clear.  Eyes: Pupils are equal and reactive. Conjunctiva normal.   Neck: Supple, full range of motion  Musculoskeletal: Atraumatic. No obvious deformities noted.  No lower extremity edema. 2+ DP and TP pulses bilaterally.  Left knee with possible mild effusion however no overlying erythema or warmth.  Skin: Warm, Dry.  No erythema, No rash.   Neurologic: Alert and oriented x3. Moving all extremities spontaneously without focal deficits.  Psychiatric: Affect normal, Mood normal, Appears appropriate and not intoxicated.    ED COURSE  Vitals:    06/12/18 2108 06/12/18 2117   BP: 108/75    Pulse: 77    Resp: 16    Temp: 37.2 °C (98.9 °F)    SpO2: 98%    Weight:  78.9 kg (173 lb 15.1 oz)   Height: 1.778 m (5' 10\")          Medications administered:  Medications   indomethacin (INDOCIN) capsule 50 mg (50 mg Oral Given 6/13/18 0051)   tramadol (ULTRAM) 50 MG tablet 50 mg (50 mg Oral Given 6/13/18 0051)         Old records personally reviewed:  Patient was seen here at [facility] on 05/11/18 and he had normal arthrocentesis without e/o infection or gout.  XR demonstrated possible small chronic fracture fragment of the medial patella. US negative for DVT.      MEDICAL DECISION MAKING  Patient presents for third recent visit for recurrent left knee pain.  He is afebrile with normal vitals on arrival.  No new trauma to suggest fracture or dislocation.  No " evidence of neurovascular compromise.  Recent US negative for DVT.  Recent arthrocentesis negative for septic arthritis or gout.  No exam findings concerning for septic arthritis at this time.  Discussed findings of recent studies and importance of follow up with PCP/Orthopedic surgery.  Patient understands plan of care for discharge and was provided with resources for follow up.  He will be provided with short course of antiinflammatories and pain medication until that time.  He also understands strict return precautions for changing or worsening symptoms.        DISPOSITION:  Patient will be discharged home in stable condition.    FOLLOW UP:  [facility]  [address] [address]  [phone]  Call  to establish PCP    [facility]  [address] [facility] [address] [phone]  Call  FOR ORTHO FOLLOW UP    [facility], Emergency Dept  [address] [id number] [phone]    If symptoms worsen      OUTPATIENT MEDICATIONS:  Discharge Medication List as of 6/13/2018  1:25 AM      START taking these medications    Details   indomethacin (INDOCIN) 50 MG Cap Take 1 Cap by mouth 3 times a day., Disp-30 Cap, R-0, Print Rx Paper      tramadol (ULTRAM) 50 MG Tab Take 1 Tab by mouth every 6 hours as needed for up to 7 days., Disp-12 Tab, R-0, Print Rx Paper, For 7 days           I reviewed prescription monitoring program for patient's narcotic use before prescribing a scheduled drug.The patient will not drink alcohol nor drive with prescribed medications. The patient will return for new or worsening symptoms and is stable at the time of discharge.    The patient is referred to a primary physician for blood pressure management, diabetic screening, and for all other preventative health concerns.    In prescribing controlled substances to this patient, I certify that I have obtained and reviewed the medical history of Cheng Rodriguez. I have also made  a good hair effort to obtain applicable records from other providers who have treated the patient and records did not demonstrate any increased risk of substance abuse that would prevent me from prescribing controlled substances.     I have conducted a physical exam and documented it. I have reviewed Mr. Rodriguez’s prescription history as maintained by the [place] Prescription Monitoring Program.     I have assessed the patient’s risk for abuse, dependency, and addiction using the validated Opioid Risk Tool available at https://www.mdcalc.com/nxtfpb-harb-fdmi-ort-narcotic-abuse.     Given the above, I believe the benefits of controlled substance therapy outweigh the risks. The reasons for prescribing controlled substances include non-narcotic, oral analgesic alternatives have been inadequate for pain control. Accordingly, I have discussed the risk and benefits, treatment plan, and alternative therapies with the patient.     IMPRESSION  (M25.562,  G89.29) Chronic pain of left knee    Results, diagnoses, and treatment options were discussed with the patient and/or family. Patient verbalized understanding of plan of care.       Willie MELARA (Steven), am scribing for, and in the presence of, Aleida Hernandez M.D..    Electronically signed by: Willie Hinton (Steven), 6/12/2018    Aleida MELARA M.D. personally performed the services described in this documentation, as scribed by Willie Hinton in my presence, and it is both accurate and complete.    The note accurately reflects work and decisions made by me.  Aleida Hernandez  6/13/2018  12:48 PM

## 2018-06-13 NOTE — ED NOTES
Pt verbalized understanding of discharge and follow up instructions. Pt given Rx.  VSS.  All questions answered, ambulates with steady gait and assistance of cane to discharge.

## 2018-06-13 NOTE — ED NOTES
Pt walks with assistance of a cane from triage. Pt c/o constant 8/10 L knee pain, states it has been bothering him for weeks. Denies any truamatic event leading up to the pain.

## 2018-07-01 ENCOUNTER — APPOINTMENT (OUTPATIENT)
Dept: RADIOLOGY | Facility: MEDICAL CENTER | Age: 53
End: 2018-07-01
Attending: EMERGENCY MEDICINE
Payer: COMMERCIAL

## 2018-07-01 ENCOUNTER — HOSPITAL ENCOUNTER (EMERGENCY)
Facility: MEDICAL CENTER | Age: 53
End: 2018-07-01
Attending: EMERGENCY MEDICINE
Payer: COMMERCIAL

## 2018-07-01 VITALS
SYSTOLIC BLOOD PRESSURE: 116 MMHG | DIASTOLIC BLOOD PRESSURE: 92 MMHG | TEMPERATURE: 97.7 F | HEART RATE: 78 BPM | RESPIRATION RATE: 16 BRPM | HEIGHT: 69 IN | WEIGHT: 174.16 LBS | BODY MASS INDEX: 25.8 KG/M2 | OXYGEN SATURATION: 98 %

## 2018-07-01 DIAGNOSIS — G89.29 CHRONIC PAIN OF LEFT KNEE: ICD-10-CM

## 2018-07-01 DIAGNOSIS — M25.562 CHRONIC PAIN OF LEFT KNEE: ICD-10-CM

## 2018-07-01 PROCEDURE — 73562 X-RAY EXAM OF KNEE 3: CPT | Mod: LT

## 2018-07-01 PROCEDURE — 36415 COLL VENOUS BLD VENIPUNCTURE: CPT

## 2018-07-01 PROCEDURE — 99284 EMERGENCY DEPT VISIT MOD MDM: CPT

## 2018-07-01 PROCEDURE — 700102 HCHG RX REV CODE 250 W/ 637 OVERRIDE(OP): Performed by: EMERGENCY MEDICINE

## 2018-07-01 PROCEDURE — A9270 NON-COVERED ITEM OR SERVICE: HCPCS | Performed by: EMERGENCY MEDICINE

## 2018-07-01 RX ORDER — HYDROCODONE BITARTRATE AND ACETAMINOPHEN 5; 325 MG/1; MG/1
1 TABLET ORAL ONCE
Status: COMPLETED | OUTPATIENT
Start: 2018-07-01 | End: 2018-07-01

## 2018-07-01 RX ADMIN — HYDROCODONE BITARTRATE AND ACETAMINOPHEN 1 TABLET: 5; 325 TABLET ORAL at 04:11

## 2018-07-01 ASSESSMENT — LIFESTYLE VARIABLES
ON A TYPICAL DAY WHEN YOU DRINK ALCOHOL HOW MANY DRINKS DO YOU HAVE: 3
EVER HAD A DRINK FIRST THING IN THE MORNING TO STEADY YOUR NERVES TO GET RID OF A HANGOVER: NO
CONSUMPTION TOTAL: POSITIVE
HAVE YOU EVER FELT YOU SHOULD CUT DOWN ON YOUR DRINKING: NO
HAVE PEOPLE ANNOYED YOU BY CRITICIZING YOUR DRINKING: NO
TOTAL SCORE: 0
HOW MANY TIMES IN THE PAST YEAR HAVE YOU HAD 5 OR MORE DRINKS IN A DAY: 0
AVERAGE NUMBER OF DAYS PER WEEK YOU HAVE A DRINK CONTAINING ALCOHOL: 5
DO YOU DRINK ALCOHOL: YES
EVER FELT BAD OR GUILTY ABOUT YOUR DRINKING: NO
TOTAL SCORE: 0
TOTAL SCORE: 0

## 2018-07-01 ASSESSMENT — PAIN SCALES - GENERAL: PAINLEVEL_OUTOF10: 8

## 2018-07-01 NOTE — ED NOTES
Pt brought back to room in wheelchair. IV started. Labs drawn and sent to lab. Monitors in place, VSS. Will continue to monitor.

## 2018-07-01 NOTE — ED NOTES
Pt resting comfortably in bed. Monitors in place VSS. No s/s of distress noted. Call bell within reach. Will continue to monitor.

## 2018-07-01 NOTE — ED PROVIDER NOTES
"ED Provider Note    CHIEF COMPLAINT  Chief Complaint   Patient presents with   • Knee Pain     \"My knee is blown out, I have an MRI the 7/6/18, I have to go to surgery for my knee but they're waiting on the MRI results. Tonight is hurts very badly.\" Pt is a poor historian and does not further describe previous injury or hx related to knee.         HPI    Primary care provider: Pcp Pt States None   History obtained from: Patient  History limited by: None     Cheng Rodriguez is a 53 y.o. male who presents to the ED complaining of left knee pain for the past 3 months.  Patient states that he has a MRI scheduled for July 6, 2018 to follow-up with [facility] for surgery.  Patient states that he was seen by the orthopedic doctor and was told that he has a ligament tear.  Patient states that he fell down earlier tonight causing increased pain mostly along the medial joint line.  He otherwise denies any other injuries or pain anywhere else.  He is requesting pain relief.  Record review shows patient was seen in this ED May 2, 2018 for left knee pain with x-ray and left lower extremity Doppler study performed.  He was again seen in this ED on May 11 for left knee pain with negative knee aspiration.  He was seen in this ED June 12, 2018 for left knee pain.    REVIEW OF SYSTEMS  Please see HPI for pertinent positives/negatives.     PAST MEDICAL HISTORY  Past Medical History:   Diagnosis Date   • Gout         SURGICAL HISTORY  No past surgical history on file.     SOCIAL HISTORY  Social History     Social History Main Topics   • Smoking status: Current Every Day Smoker     Packs/day: 0.12     Years: 24.00     Types: Cigarettes   • Smokeless tobacco: Never Used   • Alcohol use Yes      Comment: Socially   • Drug use: Yes     Types: Inhaled      Comment: Marijuana   • Sexual activity: Not on file        FAMILY HISTORY  No family history on file.     CURRENT MEDICATIONS  Home Medications    **Home medications have not yet been reviewed " "for this encounter**          ALLERGIES  No Known Allergies     PHYSICAL EXAM  VITAL SIGNS: /92   Pulse 78   Temp 36.5 °C (97.7 °F)   Resp 16   Ht 1.753 m (5' 9\")   Wt 79 kg (174 lb 2.6 oz)   SpO2 98%   BMI 25.72 kg/m²  @ROMEO[[id number]::@     Pulse ox interpretation: 99% I interpret this pulse ox as normal     Constitutional: Well developed, well nourished, alert in discomfort, nontoxic appearance   HENT: No external signs of trauma, normocephalic   Eyes: No discharge, no icterus   Neck: Trachea midline, no stridor   Cardiovascular: Strong distal pulses and good perfusion   Thorax & Lungs: No respiratory distress   Extremities: No clubbing, no cyanosis, no edema, no gross deformity, good ROM, no tenderness, intact distal pulses with brisk cap refill   Skin: Warm, dry, no pallor/cyanosis, no rash noted   Neuro: A/O times 3, no focal deficits noted   Psychiatric: Cooperative, normal mood and affect, normal judgement, appropriate for clinical situation            DIAGNOSTIC STUDIES / PROCEDURES        LABS  All labs reviewed by me.     Results for orders placed or performed during the hospital encounter of 05/11/18   FLUID CELL COUNT   Result Value Ref Range    Fluid Type Synovial     Color-Body Fluid Yellow     Character-Body Fluid Clear     Total RBC Count <2000 cells/uL    Total  cells/uL    Polys 4 %    Lymphs 45 %    Mononuclear Cells - Fluid 48 %    Mesothelial Cells - CSF 1 %    Fluid Histiocyte 2 %    Comments see below    FLUID CRYSTALS   Result Value Ref Range    Crystals, Body Fluid None Seen None Seen   FLUID CULTURE W/GRAM STAIN   Result Value Ref Range    Gram Stain Result No organisms seen.     Significant Indicator NEG     Source SYNO     Site Left Knee     Culture Result Bdf No growth at 72 hours.    GRAM STAIN   Result Value Ref Range    Significant Indicator .     Source SYNO     Site Left Knee     Gram Stain Result 05/11/2018  23:41  No organisms seen.         RADIOLOGY  The " radiologist's interpretation of all radiological studies have been reviewed by me.     DX-KNEE 3 VIEWS LEFT   Final Result         1.  No acute traumatic bony injury.   2.  Knee joint effusion             COURSE & MEDICAL DECISION MAKING  Nursing notes, VS, PMSFHx reviewed in chart.     Review of past medical records shows the patient was seen in this ED May 2, 2018, May 11 and June 12 for left knee pain.  He had negative lower extremity Doppler ultrasound for DVT.  Left knee joint tap was negative for infection or evidence of gout.  X-rays were performed as well.      Differential diagnoses considered include but are not limited to: Fx, dislocation, subluxation, contusion, strain, sprain, bursitis, tendonitis       Patient presents to the ED with above complaint.  X-rays of the left knee with findings as above.  I discussed the findings with the patient.  Patient is noted to be sleeping comfortably in bed but easily arousable in no acute distress and nontoxic in appearance.  Patient is requesting pain medicine and will be given a dose of Norco in the ED.  However, this appears to be a chronic issue for the past several months and I informed the patient that he should continue with acetaminophen/ibuprofen as needed.  He has an appointment for MRI of his knee with subsequent follow-up with [facility].  Clinically do not suspect joint infection or DVT.  Patient was given return to ED precautions.  Patient verbalized understanding with no further questions or concerns.      The patient is referred to a primary physician for blood pressure management, diabetic screening, and for all other preventative health concerns.       FINAL IMPRESSION  1. Chronic pain of left knee           DISPOSITION  Patient will be discharged home in stable condition.       FOLLOW UP  Please follow up with [facility]    Call in 1 day      [facility], Emergency Dept  [address] [facility] [address] [phone]    If symptoms  worsen         OUTPATIENT MEDICATIONS  New Prescriptions    No medications on file          Electronically signed by: Keyur Gonzalez, 7/1/2018 1:09 AM      Portions of this record were made with voice recognition software.  Despite my review, spelling/grammar/context errors may still remain.  Interpretation of this chart should be taken in this context.

## 2018-07-01 NOTE — DISCHARGE INSTRUCTIONS
Chronic Pain, Adult  Chronic pain is a type of pain that lasts or keeps coming back (recurs) for at least six months. You may have chronic headaches, abdominal pain, or body pain. Chronic pain may be related to an illness, such as fibromyalgia or complex regional pain syndrome. Sometimes the cause of chronic pain is not known.  Chronic pain can make it hard for you to do daily activities. If not treated, chronic pain can lead to other health problems, including anxiety and depression. Treatment depends on the cause and severity of your pain. You may need to work with a pain specialist to come up with a treatment plan. The plan may include medicine, counseling, and physical therapy. Many people benefit from a combination of two or more types of treatment to control their pain.  Follow these instructions at home:  Lifestyle  · Consider keeping a pain diary to share with your health care providers.  · Consider talking with a mental health care provider (psychologist) about how to cope with chronic pain.  · Consider joining a chronic pain support group.  · Try to control or lower your stress levels. Talk to your health care provider about strategies to do this.  General instructions  · Take over-the-counter and prescription medicines only as told by your health care provider.  · Follow your treatment plan as told by your health care provider. This may include:  ¨ Gentle, regular exercise.  ¨ Eating a healthy diet that includes foods such as vegetables, fruits, fish, and lean meats.  ¨ Cognitive or behavioral therapy.  ¨ Working with a physical therapist.  ¨ Meditation or yoga.  ¨ Acupuncture or massage therapy.  ¨ Aroma, color, light, or sound therapy.  ¨ Local electrical stimulation.  ¨ Shots (injections) of numbing or pain-relieving medicines into the spine or the area of pain.  · Check your pain level as told by your health care provider. Ask your health care provider if you should use a pain scale.  · Learn as much  as you can about how to manage your chronic pain. Ask your health care provider if an intensive pain rehabilitation program or a chronic pain specialist would be helpful.  · Keep all follow-up visits as told by your health care provider. This is important.  Contact a health care provider if:  · Your pain gets worse.  · You have new pain.  · You have trouble sleeping.  · You have trouble doing your normal activities.  · Your pain is not controlled with treatment.  · Your have side effects from pain medicine.  · You feel weak.  Get help right away if:  · You lose feeling or have numbness in your body.  · You lose control of bowel or bladder function.  · Your pain suddenly gets much worse.  · You develop shaking or chills.  · You develop confusion.  · You develop chest pain.  · You have trouble breathing or shortness of breath.  · You pass out.  · You have thoughts about hurting yourself or others.  This information is not intended to replace advice given to you by your health care provider. Make sure you discuss any questions you have with your health care provider.  Document Released: 09/09/2003 Document Revised: 08/17/2017 Document Reviewed: 06/06/2017  [employer] Interactive Patient Education © 2017 [employer]    Joint Pain  Joint pain, which is also called arthralgia, can be caused by many things. Joint pain often goes away when you follow your health care provider's instructions for relieving pain at home. However, joint pain can also be caused by conditions that require further treatment. Common causes of joint pain include:  · Bruising in the area of the joint.  · Overuse of the joint.  · Wear and tear on the joints that occur with aging (osteoarthritis).  · Various other forms of arthritis.  · A buildup of a crystal form of uric acid in the joint (gout).  · Infections of the joint (septic arthritis) or of the bone (osteomyelitis).  Your health care provider may recommend medicine to help with the pain. If  your joint pain continues, additional tests may be needed to diagnose your condition.  Follow these instructions at home:  Watch your condition for any changes. Follow these instructions as directed to lessen the pain that you are feeling.  · Take medicines only as directed by your health care provider.  · Rest the affected area for as long as your health care provider says that you should. If directed to do so, raise the painful joint above the level of your heart while you are sitting or lying down.  · Do not do things that cause or worsen pain.  · If directed, apply ice to the painful area:  ¨ Put ice in a plastic bag.  ¨ Place a towel between your skin and the bag.  ¨ Leave the ice on for 20 minutes, 2-3 times per day.  · Wear an elastic bandage, splint, or sling as directed by your health care provider. Loosen the elastic bandage or splint if your fingers or toes become numb and tingle, or if they turn cold and blue.  · Begin exercising or stretching the affected area as directed by your health care provider. Ask your health care provider what types of exercise are safe for you.  · Keep all follow-up visits as directed by your health care provider. This is important.  Contact a health care provider if:  · Your pain increases, and medicine does not help.  · Your joint pain does not improve within 3 days.  · You have increased bruising or swelling.  · You have a fever.  · You lose 10 lb (4.5 kg) or more without trying.  Get help right away if:  · You are not able to move the joint.  · Your fingers or toes become numb or they turn cold and blue.  This information is not intended to replace advice given to you by your health care provider. Make sure you discuss any questions you have with your health care provider.  Document Released: 12/18/2006 Document Revised: 05/19/2017 Document Reviewed: 09/29/2015  [employer] Interactive Patient Education © 2017 [employer]      Knee Pain  Knee pain is a very common symptom  and can have many causes. Knee pain often goes away when you follow your health care provider's instructions for relieving pain and discomfort at home. However, knee pain can develop into a condition that needs treatment. Some conditions may include:  · Arthritis caused by wear and tear (osteoarthritis).  · Arthritis caused by swelling and irritation (rheumatoid arthritis or gout).  · A cyst or growth in your knee.  · An infection in your knee joint.  · An injury that will not heal.  · Damage, swelling, or irritation of the tissues that support your knee (torn ligaments or tendinitis).  If your knee pain continues, additional tests may be ordered to diagnose your condition. Tests may include X-rays or other imaging studies of your knee. You may also need to have fluid removed from your knee. Treatment for ongoing knee pain depends on the cause, but treatment may include:  · Medicines to relieve pain or swelling.  · Steroid injections in your knee.  · Physical therapy.  · Surgery.  HOME CARE INSTRUCTIONS  · Take medicines only as directed by your health care provider.  · Rest your knee and keep it raised (elevated) while you are resting.  · Do not do things that cause or worsen pain.  · Avoid high-impact activities or exercises, such as running, jumping rope, or doing jumping jacks.  · Apply ice to the knee area:  ¨ Put ice in a plastic bag.  ¨ Place a towel between your skin and the bag.  ¨ Leave the ice on for 20 minutes, 2-3 times a day.  · Ask your health care provider if you should wear an elastic knee support.  · Keep a pillow under your knee when you sleep.  · Lose weight if you are overweight. Extra weight can put pressure on your knee.  · Do not use any tobacco products, including cigarettes, chewing tobacco, or electronic cigarettes. If you need help quitting, ask your health care provider. Smoking may slow the healing of any bone and joint problems that you may have.  SEEK MEDICAL CARE IF:  · Your knee  pain continues, changes, or gets worse.  · You have a fever along with knee pain.  · Your knee gustavo or locks up.  · Your knee becomes more swollen.  SEEK IMMEDIATE MEDICAL CARE IF:   · Your knee joint feels hot to the touch.  · You have chest pain or trouble breathing.  This information is not intended to replace advice given to you by your health care provider. Make sure you discuss any questions you have with your health care provider.  Document Released: 10/14/2008 Document Revised: 01/08/2016 Document Reviewed: 08/03/2015  [employer] Interactive Patient Education © 2017 [employer]

## 2018-07-01 NOTE — ED TRIAGE NOTES
"Cheng Rodriguez  53 y.o. male  Chief Complaint   Patient presents with   • Knee Pain     \"My knee is blown out, I have an MRI the 7/6/18, I have to go to surgery for my knee but they're waiting on the MRI results. Tonight is hurts very badly.\" Pt is a poor historian and does not further describe previous injury or hx related to knee.        Pt amb to triage with limp for above complaint.   Pt is alert and oriented, speaking in full sentences, follows commands and responds appropriately to questions. Pt states he is in severe pain. Resp are even and unlabored.    Pt placed in lobby. Pt educated on triage process. Pt encouraged to alert staff for any changes.    "

## 2018-07-06 ENCOUNTER — HOSPITAL ENCOUNTER (OUTPATIENT)
Dept: RADIOLOGY | Facility: MEDICAL CENTER | Age: 53
End: 2018-07-06
Attending: PHYSICIAN ASSISTANT
Payer: COMMERCIAL

## 2018-07-06 DIAGNOSIS — M25.462 EFFUSION OF LEFT KNEE: ICD-10-CM

## 2018-07-06 DIAGNOSIS — M25.562 LEFT KNEE PAIN, UNSPECIFIED CHRONICITY: ICD-10-CM

## 2018-07-06 PROCEDURE — 73721 MRI JNT OF LWR EXTRE W/O DYE: CPT | Mod: LT

## 2018-07-15 RX ORDER — OMEPRAZOLE 40 MG/1
40 CAPSULE, DELAYED RELEASE ORAL
COMMUNITY

## 2018-07-15 RX ORDER — RANITIDINE 150 MG/1
150 CAPSULE ORAL
COMMUNITY

## 2018-07-15 NOTE — PROGRESS NOTES
"New Patient to Establish    Reason to establish: New patient to establish    CC: left knee pain    HPI: Cheng Rodriguez is a 53 y.o. male who presents today to establish care (Dr. Eric Silveira is prior PCP) and for the following concerns:    Left knee pain  Used to live in [place], went through 8 doctors, had 2 vascular surgeries, \"major [left] knee pain,\" moved to [place] 3 years ago, wants to establish care. Has been having left knee pain for 1 year, denies trauma history, \"has popping type pain\", can't run/jump/jog, can walk about 4 blocks or a quarter of a mile before having to sit down which relieves pain, 8-10/10 at worst, non-radiating, has tried Tylenol 500 mg TID and Tramadol which hasn't helped, smoking marijuana daily for pain which helps. 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 Denies instability, falls. Was seen in this ED May 2, 2018, May 11 and June 12, July 1 for left knee pain, requesting pain medication in ED previously. MRI showed medial meniscus tear, tendinopathy of anterior/medial/posterior collateral ligaments, and joint effusion. Sees an Orthopedic Surgeon (doesn't remember name) at [facility] on [address], is expecting a call today for scheduling follow up. Has MRI scheduled 7/17 for right knee.    Gout  \"My gout is gone,\" states he had a joint aspiration of left knee 2 years ago and crystals consistent with gout were found. States he has never had gout in any joint besides left knee. States he was in ED this last May for left knee pain, had joint aspiration that did not show gout but was given indomethacin for gout which helped his knee pain.    History of proteinuria  Seen by [facility] Dr. Chapin 4/9/15, whose note at that time states he had a 24-hour urine collection that showed about 413 mg of protein that was not thought to be due to hepatitis C related vasculitis or omeprazole related interstitial nephritis due to bland urine " "sediment, and that patient was noted to be using NSAIDs regularly and to stop their use. Creatinine at that time was within normal limits.    History of Hepatitis C  Per records review, patient previously stated that he probably got hepatitis C in 1991 or 1992 when he was donating blood for plasma in [place] for money, but was also noted to have unprotected sexual encounters at that time. He was deemed to be a suitable candidate for antiviral therapy for chronic hepatitis C infection, genotype 1A with a high viral load with a significant degree of liver fibrosis with a FibroSURE test score of F4, and completed Harvoni (ledipasvir/sofosbuvir) therapy.    Healthcare maintenance   Has seen Gastroenterology 4 years ago, doesn't know name of doctor or group, had polyp but states it wasn't removed and \"nothing needed to be done,\" is unsure of colonoscopy interval changes.  Requesting Ophthalmology referral for contacts/glasses.  Has seen Cardiologist in the past for \"general physicals\" but states he never had any heart-related diagnoses or procedures, no history of ACS or CHF.    Medical history  Denies surgeries besides knee. Denies metabolic syndrome conditions.    Social History  \"I work under the table,\" currently rebuilding a house, . Smokes 1 cigarette a day, first cigarette age 19, states only started smoking regularly only since age 48, has difficulty characterizing amount of smoking, at most 1 pack every 3 days. \"I can quit anytime, I am not addicted.\" Drinks 1-2 beers a week. Denies illicit drugs.    Family history  Mother: throat cancer  Father: alcohol use disorder      Patient Active Problem List    Diagnosis Date Noted   • Left knee pain 07/16/2018   • Gout 07/16/2018   • Decreased visual acuity 07/16/2018   • Viral hepatitis C 04/09/2015   • Proteinuria 04/09/2015       Past Medical History:   Diagnosis Date   • Gout    • Hepatitis C     s/p Harvoni per pt       Current Outpatient " "Prescriptions   Medication Sig Dispense Refill   • meloxicam (MOBIC) 7.5 MG Tab Take 1 Tab by mouth 1 time daily as needed for Moderate Pain or Severe Pain (for pain not relieved by Tylenol) for up to 30 days. 30 Tab 0   • acetaminophen (TYLENOL) 500 MG Tab Take 2 Tabs by mouth every 8 hours as needed for Mild Pain or Moderate Pain for up to 30 days. 180 Tab 0   • omeprazole (PRILOSEC) 40 MG delayed-release capsule Take 40 mg by mouth.     • ranitidine (ZANTAC) 150 MG capsule Take 150 mg by mouth.       No current facility-administered medications for this visit.        Allergies as of 07/16/2018   • (No Known Allergies)       Social History     Social History   • Marital status: Single     Spouse name: N/A   • Number of children: N/A   • Years of education: N/A     Occupational History   • Not on file.     Social History Main Topics   • Smoking status: Current Every Day Smoker     Packs/day: 0.12     Years: 24.00     Types: Cigarettes   • Smokeless tobacco: Never Used   • Alcohol use Yes      Comment: Socially   • Drug use: Yes     Types: Inhaled      Comment: Marijuana   • Sexual activity: Not on file     Other Topics Concern   • Not on file     Social History Narrative   • No narrative on file       Family History   Problem Relation Age of Onset   • Cancer Mother      throat   • Alcohol/Drug Father        Surgical history  \"Vascular surgery for my left knee\"    ROS: As per HPI. Additional pertinent symptoms as noted below.    Constitutional: Denies weight loss, fever, chills, weakness, malaise.  Eyes: Denies blurred vision, double vision, yellow sclerae.  ENT: Denies hearing loss, congestion, runny nose, sore throat.  Cardiovascular: Denies chest pain, palpitations.  Respiratory: Denies dyspnea, productive cough.  GI: Denies nausea, vomiting, diarrhea, abdominal pain.  : Denies dysuria, urinary urgency or frequency.  Musculo-skeletal: See HPI.  Skin: Denies change in skin, hair, nails.  Neurological: Denies " "paresthesias, fasciculations, seizures, focal weakness.  Psychological: Denies change in personality, affect, depression.  All others negative    /77   Pulse 78   Temp 36.2 °C (97.1 °F)   Ht 1.803 m (5' 11\")   Wt 79.8 kg (176 lb)   SpO2 95%   BMI 24.55 kg/m²     Physical Exam  General: No apparent distress.  Eyes: Extraocular movements grossly intact. No scleral icterus.  Throat: No erythema or exudates noted.  Neck: Supple. No lymphadenopathy noted.  Lungs: Clear to auscultation bilaterally.  Cardiovascular: Regular rate and rhythm.  Abdomen: Soft, non-tender, non-distended.  Extremities: No cyanosis or edema. Stable gait but favors right side with limp. 5/5 strength bilateral lower extremities except knee flexion on left with giveaway, normal range of motion. Kash's on left limited due to pain of medial knee.  Skin: No rash or suspicious skin lesions noted on exposed skin.  Neurological: Alert and oriented.  Psychiatric: Normal mood and affect.    Note: I have reviewed all pertinent labs and diagnostic tests associated with this visit with specific comments listed under the assessment and plan below    Assessment and Plan    1. Chronic pain of left knee  Meniscal and multiple collateral ligament involvement or left knee per recent MRI. Pain limiting function but able to work part time, no falls. Has already started the process of Orthopedic Surgery clinic appointment for evaluation.  - meloxicam (MOBIC) 7.5 MG Tab; Take 1 Tab by mouth 1 time daily as needed for Moderate Pain or Severe Pain (for pain not relieved by Tylenol) for up to 30 days.  Dispense: 30 Tab; Refill: 0  - acetaminophen (TYLENOL) 500 MG Tab; Take 2 Tabs by mouth every 8 hours as needed for Mild Pain or Moderate Pain for up to 30 days.  Dispense: 180 Tab; Refill: 0  - follow up Orthopedic Surgery    2. Chronic gout of left knee, unspecified cause  Well controlled per history, last flare was 2 years ago per patient. Not on " "allopurinol. Will check uric acid for baseline level not on medication.  - URIC ACID, SERUM    3. Decreased visual acuity  Requesting referral for new glasses.  - REFERRAL TO OPHTHALMOLOGY    4. History of proteinuria  Etiology unclear per last [facility] note 2015.  - URINALYSIS,CULTURE IF INDICATED; Future  - Discontinue NSAID use after Orthopedic Surgery/improvement of knee pain unless stopping earlier is warranted based on lab results    4. History of hepatitis C s/p treatment  No longer following with Hepatology per patent.  - COMP METABOLIC PANEL; Future    5. Healthcare maintenance  Need recent lipid panel for ASCVD risk calculation. Need records from prior PCP for, colonoscopy, Cardiology notes/?diagnoses, \"vascular surgery of knee\" records. Also need screening labs.  - CBC WITH DIFFERENTIAL; Future  - COMP METABOLIC PANEL; Future  - LIPID PANEL  - POCT  A1C  - HEMOGLOBIN A1C; Future    Followup: Return in about 5 weeks (around 8/20/2018).    Risk Assessment (discuss potential complications a function of chronic problems): functional limitation due to joint pain, gout recurrence, kidney disease sequelae, colon cancer risk    Complexity (discuss number of co-morbidities): 5    Signed by: Ronnie Linares M.D.  "

## 2018-07-16 ENCOUNTER — OFFICE VISIT (OUTPATIENT)
Dept: INTERNAL MEDICINE | Facility: MEDICAL CENTER | Age: 53
End: 2018-07-16
Payer: COMMERCIAL

## 2018-07-16 VITALS
WEIGHT: 176 LBS | HEIGHT: 71 IN | OXYGEN SATURATION: 95 % | DIASTOLIC BLOOD PRESSURE: 77 MMHG | HEART RATE: 78 BPM | BODY MASS INDEX: 24.64 KG/M2 | SYSTOLIC BLOOD PRESSURE: 117 MMHG | TEMPERATURE: 97.1 F

## 2018-07-16 DIAGNOSIS — B19.20 HEPATITIS C VIRUS INFECTION WITHOUT HEPATIC COMA, UNSPECIFIED CHRONICITY: ICD-10-CM

## 2018-07-16 DIAGNOSIS — M25.562 CHRONIC PAIN OF LEFT KNEE: ICD-10-CM

## 2018-07-16 DIAGNOSIS — G89.29 CHRONIC PAIN OF LEFT KNEE: ICD-10-CM

## 2018-07-16 DIAGNOSIS — M1A.0620 CHRONIC GOUT OF LEFT KNEE, UNSPECIFIED CAUSE: ICD-10-CM

## 2018-07-16 DIAGNOSIS — Z00.00 HEALTHCARE MAINTENANCE: ICD-10-CM

## 2018-07-16 DIAGNOSIS — H54.7 DECREASED VISUAL ACUITY: ICD-10-CM

## 2018-07-16 DIAGNOSIS — R80.9 PROTEINURIA, UNSPECIFIED TYPE: ICD-10-CM

## 2018-07-16 PROBLEM — M10.9 GOUT: Status: ACTIVE | Noted: 2018-07-16

## 2018-07-16 PROCEDURE — 99204 OFFICE O/P NEW MOD 45 MIN: CPT | Mod: GC | Performed by: INTERNAL MEDICINE

## 2018-07-16 RX ORDER — ACETAMINOPHEN 500 MG
1000 TABLET ORAL EVERY 8 HOURS PRN
Status: DISCONTINUED | OUTPATIENT
Start: 2018-07-16 | End: 2018-07-16

## 2018-07-16 RX ORDER — ACETAMINOPHEN 500 MG
1000 TABLET ORAL EVERY 8 HOURS PRN
Qty: 180 TAB | Refills: 0 | Status: SHIPPED | OUTPATIENT
Start: 2018-07-16 | End: 2018-08-15

## 2018-07-16 RX ORDER — MELOXICAM 7.5 MG/1
7.5 TABLET ORAL
Qty: 30 TAB | Refills: 0 | Status: SHIPPED | OUTPATIENT
Start: 2018-07-16 | End: 2018-08-15

## 2018-07-16 RX ORDER — COLCHICINE 0.6 MG/1
0.6 CAPSULE ORAL DAILY
Refills: 0 | COMMUNITY
Start: 2018-05-03 | End: 2018-07-16

## 2018-07-16 ASSESSMENT — PATIENT HEALTH QUESTIONNAIRE - PHQ9: CLINICAL INTERPRETATION OF PHQ2 SCORE: 0

## 2018-07-16 NOTE — LETTER
[facility]  Ronnie Linares M.D.  [address] [address]  Fax: [phone]   Authorization for Release/Disclosure of   Protected Health Information   Name: CHENG RODRIGUEZ : 1965 SSN: [id number]   Address: [address] [place] [id number] Phone:    [phone] (home)    I authorize the entity listed below to release/disclose the PHI below to:   [facility]/Ronnie Linares M.D. and Rnonie Linares M.D.   Provider or Entity Name:     Address   City, State, Zip   Phone:      Fax:     Reason for request: continuity of care   Information to be released:    [  ] LAST COLONOSCOPY,  including any PATH REPORT and follow-up  [  ] LAST FIT/COLOGUARD RESULT [  ] LAST DEXA  [  ] LAST MAMMOGRAM  [  ] LAST PAP  [  ] LAST LABS [  ] RETINA EXAM REPORT  [  ] IMMUNIZATION RECORDS  [  ] Release all info      [  ] Check here and initial the line next to each item to release ALL health information INCLUDING  _____ Care and treatment for drug and / or alcohol abuse  _____ HIV testing, infection status, or AIDS  _____ Genetic Testing    DATES OF SERVICE OR TIME PERIOD TO BE DISCLOSED: _____________  I understand and acknowledge that:  * This Authorization may be revoked at any time by you in writing, except if your health information has already been used or disclosed.  * Your health information that will be used or disclosed as a result of you signing this authorization could be re-disclosed by the recipient. If this occurs, your re-disclosed health information may no longer be protected by State or Federal laws.  * You may refuse to sign this Authorization. Your refusal will not affect your ability to obtain treatment.  * This Authorization becomes effective upon signing and will  on (date) __________.      If no date is indicated, this Authorization will  one (1) year from the signature date.    Name: Cheng Rodriguez    Signature:   Date:     2018       PLEASE FAX REQUESTED RECORDS BACK  TO: [phone]

## 2018-07-16 NOTE — PATIENT INSTRUCTIONS
Get fasting labs before next visit  Use Tylenol as prescribed for left knee pain first, if still in pain at directed dosage/frequency then use meloxicam as directed, but always use Tylenol first, stop taking meloxicam if heartburn or blood in urine/stool  Schedule Ophthalmology appointment